# Patient Record
(demographics unavailable — no encounter records)

---

## 2025-01-09 NOTE — HISTORY OF PRESENT ILLNESS
[0] : ~His/Her~ pain was 0 out of 10 [FreeTextEntry1] : Mr. LOVELL is a 80 y/o male with hx of symptomatic BPH s/p left PAE on 12/13 who presents to IR for follow up today, as referred by Dr Coyne.    Hx: Mr. LOVELL reports LUTS (incomplete emptying, urgency, hesitancy, weak stream, straining, nocturia 3-5 x ) are compromising his quality of life. Reports LUTS returned this past June & have progressively worsened. KUB 11/29/22 79 cc prostate.    Dr Coyne PCP Dr Shar Tang Psych Dr Veronica Barney

## 2025-01-09 NOTE — CONSULT LETTER
[Dear  ___] : Dear  [unfilled], [Courtesy Letter:] : I had the pleasure of seeing your patient, [unfilled], in my office today. [Please see my note below.] : Please see my note below. [DrMar  ___] : Dr. CABRERA

## 2025-01-09 NOTE — ASSESSMENT
[Other: _____] : [unfilled] [FreeTextEntry1] : Mr. LOVELL is a 80 y/o male with hx of symptomatic BPH s/p PAE on 12/13 who presents to IR for follow up today, as referred by Dr Coyne.   # BPH with LUTS / Obstructive Symptoms - C/o hx (incomplete emptying, urgency, hesitancy, weak stream, straining, nocturia 3-5 x) - IPSS score 9/17/2024 :  29 - now s/p left PAE on 12/13/2024 - BPH medication modification, PSA monitoring & imaging as per your urologist - continue with consistent follow ups with your urologist - no further IR follow ups are required given interval improvement in LUTS / successful guzman removal - IR f/u in  months -    Mr. LOVELL's comprehension was confirmed & all questions were asked & answered to his satisfaction. IR contact information was reviewed with the pt should there be any questions, issues, or concerns, to be addressed.